# Patient Record
Sex: MALE | Race: WHITE | NOT HISPANIC OR LATINO
[De-identification: names, ages, dates, MRNs, and addresses within clinical notes are randomized per-mention and may not be internally consistent; named-entity substitution may affect disease eponyms.]

---

## 2023-07-19 PROBLEM — Z00.00 ENCOUNTER FOR PREVENTIVE HEALTH EXAMINATION: Status: ACTIVE | Noted: 2023-07-19

## 2023-07-21 ENCOUNTER — APPOINTMENT (OUTPATIENT)
Dept: ORTHOPEDIC SURGERY | Facility: CLINIC | Age: 23
End: 2023-07-21
Payer: COMMERCIAL

## 2023-07-21 VITALS — WEIGHT: 170 LBS | HEIGHT: 71 IN | BODY MASS INDEX: 23.8 KG/M2

## 2023-07-21 DIAGNOSIS — M25.511 PAIN IN RIGHT SHOULDER: ICD-10-CM

## 2023-07-21 DIAGNOSIS — Z78.9 OTHER SPECIFIED HEALTH STATUS: ICD-10-CM

## 2023-07-21 PROCEDURE — 73030 X-RAY EXAM OF SHOULDER: CPT | Mod: RT

## 2023-07-21 PROCEDURE — 99203 OFFICE O/P NEW LOW 30 MIN: CPT | Mod: 25

## 2023-07-21 PROCEDURE — 73010 X-RAY EXAM OF SHOULDER BLADE: CPT | Mod: RT

## 2023-07-21 RX ORDER — NAPROXEN 500 MG/1
500 TABLET ORAL
Qty: 60 | Refills: 0 | Status: ACTIVE | COMMUNITY
Start: 2023-07-21 | End: 1900-01-01

## 2023-07-21 NOTE — REASON FOR VISIT
[FreeTextEntry2] : This is a 23 year old RHD male college graduate with right shoulder pain since 7/13/2023.  Patient was stretching the shoulder and felt pain.  There has been popping since then.  Reaching is painful.  Night symptoms do not occur.  There is no n/t. Denies NSAID use.  No prior history of injury or surgery.

## 2023-07-21 NOTE — IMAGING
[Right] : right shoulder [FreeTextEntry1] : The GH and AC joints look ok. [FreeTextEntry5] : There is a Type I-II Acromion.

## 2023-07-21 NOTE — PHYSICAL EXAM
[Right] : right shoulder [5 ___] : forward flexion 5[unfilled]/5 [] : no sensory deficits [Left] : left shoulder [de-identified] : This is minimal.  [FreeTextEntry9] : IR to T10 [TWNoteComboBox4] : passive forward flexion 165 degrees [TWNoteComboBox6] : False [de-identified] : external rotation 50 degrees

## 2023-07-21 NOTE — HISTORY OF PRESENT ILLNESS
[5] : 5 [0] : 0 [Sleep] : sleep [Rest] : rest [Ice] : ice [] : no [FreeTextEntry1] : right shoulder  [FreeTextEntry5] : pt states he has been having right shoulder pain for about a week after picking up a wine bottle, has limited range of motion  [de-identified] : movement

## 2023-07-21 NOTE — ASSESSMENT
[FreeTextEntry1] : We reviewed the findings and the history.\par The options were outlined.\par The long vs. short term issues were addressed.\par The patient was prescribed Naproxen.\par Questions were answered and concerns addressed.\par \par Patient seen by Christian Harper M.D.\par Dr. Christian Harper\par Shoulder Surgery \par The documentation recorded by the scribe accurately reflects the service I personally performed and the decisions made by me.\par Entered by Feliciano Ramesh acting as scribe.